# Patient Record
Sex: MALE | Race: BLACK OR AFRICAN AMERICAN | NOT HISPANIC OR LATINO | URBAN - METROPOLITAN AREA
[De-identification: names, ages, dates, MRNs, and addresses within clinical notes are randomized per-mention and may not be internally consistent; named-entity substitution may affect disease eponyms.]

---

## 2024-07-15 ENCOUNTER — OFFICE VISIT (OUTPATIENT)
Dept: URGENT CARE | Facility: URGENT CARE | Age: 64
End: 2024-07-15

## 2024-07-15 VITALS
TEMPERATURE: 96.9 F | SYSTOLIC BLOOD PRESSURE: 126 MMHG | OXYGEN SATURATION: 98 % | DIASTOLIC BLOOD PRESSURE: 78 MMHG | HEART RATE: 53 BPM | WEIGHT: 148.9 LBS | RESPIRATION RATE: 16 BRPM

## 2024-07-15 DIAGNOSIS — R10.9 FLANK PAIN: Primary | ICD-10-CM

## 2024-07-15 PROCEDURE — 99204 OFFICE O/P NEW MOD 45 MIN: CPT

## 2024-07-15 RX ORDER — ACETAMINOPHEN 500 MG
500-1000 TABLET ORAL EVERY 6 HOURS PRN
COMMUNITY

## 2024-07-15 NOTE — PROGRESS NOTES
Assessment & Plan   (R10.9) Flank pain  (primary encounter diagnosis)    Informed the patient that given his persistent flank pain, past medical history, associated symptoms of intermittent nausea/vomiting, indication by previous provider to obtain an ultrasound should his symptoms persist and his clinical exam today; it is advised that he proceed to an emergency department for further evaluation and treatment.  Patient acknowledged his understanding of the above plan and indicated he would proceed to an emergency department for further evaluation and treatment.  Patient stable to be transported via private transportation to an emergency department for further evaluation and treatment.    The use of Dragon/SCHED[employer] services may have been used to construct the content in this note; any grammatical or spelling errors are non-intentional. Please contact the author of this note directly if you are in need of any clarification.      [facility] is [facility] 63 year old male who presents to clinic today for the following health issues:  Chief Complaint   Patient presents with    Abdominal Pain     Right side pain and discomfort for about 2 months. Was told to get an ultrasound since last evaluation about 3 weeks ago. Pain not getting better.     HPI  Patient reports having bilateral flank pain with intermittent nausea/vomiting for the past 2 months.  He also reports he was evaluated for his symptoms approximately 3 weeks ago and was told that if symptoms persist he should obtain an ultrasound.  He rates the bilateral flank pain as 6/10 and describes it as achy.  He has utilized Tylenol and ibuprofen for treatment and denies any urinary symptoms.    ROS:  Negative except noted above.    Review of Systems        Objective    /78 (BP Location: Left arm, Patient Position: Sitting, Cuff Size: Adult Regular)   Pulse 53   Temp 96.9   F (36.1  C) (Tympanic)   Resp 16   Wt 67.5 kg (148 lb 14.4 oz)   SpO2 98%   Physical Exam   GENERAL: alert and no distress  RESP: lungs clear to auscultation - no rales, rhonchi or wheezes  CV: regular rate and rhythm, normal S1 S2, no S3 or S4, no murmur, click or rub, no peripheral edema  BACK: bilateral CVA tenderness  SKIN: no suspicious lesions or rashes

## 2024-07-15 NOTE — PATIENT INSTRUCTIONS
Given your persistent flank pain, past medical history, associated symptoms of intermittent nausea/vomiting, indication by a previous provider to obtain an ultrasound and your clinical exam today; it is advised that you proceed to an emergency department for further evaluation and treatment.